# Patient Record
Sex: MALE | Race: WHITE | NOT HISPANIC OR LATINO | Employment: OTHER | URBAN - NONMETROPOLITAN AREA
[De-identification: names, ages, dates, MRNs, and addresses within clinical notes are randomized per-mention and may not be internally consistent; named-entity substitution may affect disease eponyms.]

---

## 2020-01-23 ENCOUNTER — OFFICE VISIT CONVERTED (OUTPATIENT)
Dept: FAMILY MEDICINE CLINIC | Facility: CLINIC | Age: 75
End: 2020-01-23
Attending: FAMILY MEDICINE

## 2020-01-23 ENCOUNTER — HOSPITAL ENCOUNTER (OUTPATIENT)
Dept: GENERAL RADIOLOGY | Facility: HOSPITAL | Age: 75
Discharge: HOME | End: 2020-01-23
Attending: FAMILY MEDICINE

## 2020-01-23 ENCOUNTER — CONVERSION ENCOUNTER (OUTPATIENT)
Dept: FAMILY MEDICINE CLINIC | Facility: CLINIC | Age: 75
End: 2020-01-23

## 2020-01-23 LAB
ALBUMIN SERPL-MCNC: 4.5 G/DL (ref 3.5–5)
ALBUMIN/GLOB SERPL: 1.6 {RATIO} (ref 1.4–2.6)
ALP SERPL-CCNC: 160 U/L (ref 56–155)
ALT SERPL-CCNC: 26 U/L (ref 10–40)
ANION GAP SERPL CALC-SCNC: 24 MMOL/L (ref 8–19)
AST SERPL-CCNC: 24 U/L (ref 15–50)
BASOPHILS # BLD AUTO: 0.04 10*3/UL (ref 0–0.2)
BASOPHILS NFR BLD AUTO: 0.6 % (ref 0–3)
BILIRUB SERPL-MCNC: 0.51 MG/DL (ref 0.2–1.3)
BUN SERPL-MCNC: 10 MG/DL (ref 5–25)
BUN/CREAT SERPL: 12 {RATIO} (ref 6–20)
CALCIUM SERPL-MCNC: 10.1 MG/DL (ref 8.7–10.4)
CHLORIDE SERPL-SCNC: 102 MMOL/L (ref 99–111)
CHOLEST SERPL-MCNC: 174 MG/DL (ref 107–200)
CHOLEST/HDLC SERPL: 3.9 {RATIO} (ref 3–6)
CONV ABS IMM GRAN: 0.02 10*3/UL (ref 0–0.2)
CONV CO2: 18 MMOL/L (ref 22–32)
CONV CREATININE URINE, RANDOM: 46.4 MG/DL (ref 10–300)
CONV IMMATURE GRAN: 0.3 % (ref 0–1.8)
CONV MICROALBUM.,U,RANDOM: <12 MG/L (ref 0–20)
CONV TOTAL PROTEIN: 7.4 G/DL (ref 6.3–8.2)
CREAT UR-MCNC: 0.86 MG/DL (ref 0.7–1.2)
DEPRECATED RDW RBC AUTO: 43.5 FL (ref 35.1–43.9)
EOSINOPHIL # BLD AUTO: 0.09 10*3/UL (ref 0–0.7)
EOSINOPHIL # BLD AUTO: 1.4 % (ref 0–7)
ERYTHROCYTE [DISTWIDTH] IN BLOOD BY AUTOMATED COUNT: 12.2 % (ref 11.6–14.4)
EST. AVERAGE GLUCOSE BLD GHB EST-MCNC: 223 MG/DL
GFR SERPLBLD BASED ON 1.73 SQ M-ARVRAT: >60 ML/MIN/{1.73_M2}
GLOBULIN UR ELPH-MCNC: 2.9 G/DL (ref 2–3.5)
GLUCOSE SERPL-MCNC: 217 MG/DL (ref 70–99)
HBA1C MFR BLD: 9.4 % (ref 3.5–5.7)
HCT VFR BLD AUTO: 45.7 % (ref 42–52)
HDLC SERPL-MCNC: 45 MG/DL (ref 40–60)
HGB BLD-MCNC: 14.8 G/DL (ref 14–18)
LDLC SERPL CALC-MCNC: 116 MG/DL (ref 70–100)
LYMPHOCYTES # BLD AUTO: 1.55 10*3/UL (ref 1–5)
LYMPHOCYTES NFR BLD AUTO: 24.1 % (ref 20–45)
MCH RBC QN AUTO: 30.8 PG (ref 27–31)
MCHC RBC AUTO-ENTMCNC: 32.4 G/DL (ref 33–37)
MCV RBC AUTO: 95 FL (ref 80–96)
MICROALBUMIN/CREAT UR: 25.9 MG/G{CRE} (ref 0–25)
MONOCYTES # BLD AUTO: 0.36 10*3/UL (ref 0.2–1.2)
MONOCYTES NFR BLD AUTO: 5.6 % (ref 3–10)
NEUTROPHILS # BLD AUTO: 4.38 10*3/UL (ref 2–8)
NEUTROPHILS NFR BLD AUTO: 68 % (ref 30–85)
NRBC CBCN: 0 % (ref 0–0.7)
OSMOLALITY SERPL CALC.SUM OF ELEC: 294 MOSM/KG (ref 273–304)
PLATELET # BLD AUTO: 215 10*3/UL (ref 130–400)
PMV BLD AUTO: 11.6 FL (ref 9.4–12.4)
POTASSIUM SERPL-SCNC: 4.7 MMOL/L (ref 3.5–5.3)
PSA SERPL-MCNC: 4.01 NG/ML (ref 0–4)
RBC # BLD AUTO: 4.81 10*6/UL (ref 4.7–6.1)
SODIUM SERPL-SCNC: 139 MMOL/L (ref 135–147)
TRIGL SERPL-MCNC: 65 MG/DL (ref 40–150)
TSH SERPL-ACNC: 3.11 M[IU]/L (ref 0.27–4.2)
VLDLC SERPL-MCNC: 13 MG/DL (ref 5–37)
WBC # BLD AUTO: 6.44 10*3/UL (ref 4.8–10.8)

## 2020-01-27 ENCOUNTER — HOSPITAL ENCOUNTER (OUTPATIENT)
Dept: ULTRASOUND IMAGING | Facility: HOSPITAL | Age: 75
Discharge: HOME | End: 2020-01-27
Attending: FAMILY MEDICINE

## 2020-02-14 ENCOUNTER — OFFICE VISIT CONVERTED (OUTPATIENT)
Dept: FAMILY MEDICINE CLINIC | Facility: CLINIC | Age: 75
End: 2020-02-14
Attending: FAMILY MEDICINE

## 2020-02-14 ENCOUNTER — CONVERSION ENCOUNTER (OUTPATIENT)
Dept: FAMILY MEDICINE CLINIC | Facility: CLINIC | Age: 75
End: 2020-02-14

## 2020-02-20 ENCOUNTER — HOSPITAL ENCOUNTER (OUTPATIENT)
Dept: CT IMAGING | Facility: HOSPITAL | Age: 75
Discharge: HOME | End: 2020-02-20
Attending: FAMILY MEDICINE

## 2020-11-20 ENCOUNTER — OFFICE VISIT CONVERTED (OUTPATIENT)
Dept: UROLOGY | Facility: CLINIC | Age: 75
End: 2020-11-20
Attending: NURSE PRACTITIONER

## 2020-12-04 ENCOUNTER — OFFICE VISIT CONVERTED (OUTPATIENT)
Dept: UROLOGY | Facility: CLINIC | Age: 75
End: 2020-12-04
Attending: NURSE PRACTITIONER

## 2020-12-10 ENCOUNTER — OFFICE VISIT CONVERTED (OUTPATIENT)
Dept: UROLOGY | Facility: CLINIC | Age: 75
End: 2020-12-10
Attending: NURSE PRACTITIONER

## 2021-01-07 ENCOUNTER — OFFICE VISIT CONVERTED (OUTPATIENT)
Dept: FAMILY MEDICINE CLINIC | Facility: CLINIC | Age: 76
End: 2021-01-07
Attending: FAMILY MEDICINE

## 2021-01-07 ENCOUNTER — HOSPITAL ENCOUNTER (OUTPATIENT)
Dept: GENERAL RADIOLOGY | Facility: HOSPITAL | Age: 76
Discharge: HOME | End: 2021-01-07
Attending: FAMILY MEDICINE

## 2021-01-07 LAB
ALBUMIN SERPL-MCNC: 3.2 G/DL (ref 3.5–5)
ALBUMIN/GLOB SERPL: 0.8 {RATIO} (ref 1.4–2.6)
ALP SERPL-CCNC: 214 U/L (ref 56–155)
ALT SERPL-CCNC: 23 U/L (ref 10–40)
ANION GAP SERPL CALC-SCNC: 14 MMOL/L (ref 8–19)
AST SERPL-CCNC: 19 U/L (ref 15–50)
BASOPHILS # BLD AUTO: 0.04 10*3/UL (ref 0–0.2)
BASOPHILS NFR BLD AUTO: 0.4 % (ref 0–3)
BILIRUB SERPL-MCNC: 0.29 MG/DL (ref 0.2–1.3)
BUN SERPL-MCNC: 10 MG/DL (ref 5–25)
BUN/CREAT SERPL: 10 {RATIO} (ref 6–20)
CALCIUM SERPL-MCNC: 10 MG/DL (ref 8.7–10.4)
CHLORIDE SERPL-SCNC: 99 MMOL/L (ref 99–111)
CONV ABS IMM GRAN: 0.05 10*3/UL (ref 0–0.2)
CONV CO2: 28 MMOL/L (ref 22–32)
CONV CREATININE URINE, RANDOM: 38.7 MG/DL (ref 10–300)
CONV IMMATURE GRAN: 0.5 % (ref 0–1.8)
CONV MICROALBUM.,U,RANDOM: 26.5 MG/L (ref 0–20)
CONV TOTAL PROTEIN: 7 G/DL (ref 6.3–8.2)
CREAT UR-MCNC: 0.96 MG/DL (ref 0.7–1.2)
DEPRECATED RDW RBC AUTO: 42.2 FL (ref 35.1–43.9)
EOSINOPHIL # BLD AUTO: 0.16 10*3/UL (ref 0–0.7)
EOSINOPHIL # BLD AUTO: 1.7 % (ref 0–7)
ERYTHROCYTE [DISTWIDTH] IN BLOOD BY AUTOMATED COUNT: 12 % (ref 11.6–14.4)
EST. AVERAGE GLUCOSE BLD GHB EST-MCNC: 306 MG/DL
GFR SERPLBLD BASED ON 1.73 SQ M-ARVRAT: >60 ML/MIN/{1.73_M2}
GLOBULIN UR ELPH-MCNC: 3.8 G/DL (ref 2–3.5)
GLUCOSE SERPL-MCNC: 237 MG/DL (ref 70–99)
HBA1C MFR BLD: 12.3 % (ref 3.5–5.7)
HCT VFR BLD AUTO: 36.2 % (ref 42–52)
HGB BLD-MCNC: 11.7 G/DL (ref 14–18)
LYMPHOCYTES # BLD AUTO: 1.32 10*3/UL (ref 1–5)
LYMPHOCYTES NFR BLD AUTO: 14.1 % (ref 20–45)
MCH RBC QN AUTO: 30.7 PG (ref 27–31)
MCHC RBC AUTO-ENTMCNC: 32.3 G/DL (ref 33–37)
MCV RBC AUTO: 95 FL (ref 80–96)
MICROALBUMIN/CREAT UR: 68.5 MG/G{CRE} (ref 0–25)
MONOCYTES # BLD AUTO: 0.51 10*3/UL (ref 0.2–1.2)
MONOCYTES NFR BLD AUTO: 5.4 % (ref 3–10)
NEUTROPHILS # BLD AUTO: 7.3 10*3/UL (ref 2–8)
NEUTROPHILS NFR BLD AUTO: 77.9 % (ref 30–85)
NRBC CBCN: 0 % (ref 0–0.7)
OSMOLALITY SERPL CALC.SUM OF ELEC: 289 MOSM/KG (ref 273–304)
PLATELET # BLD AUTO: 574 10*3/UL (ref 130–400)
PMV BLD AUTO: 10.3 FL (ref 9.4–12.4)
POTASSIUM SERPL-SCNC: 4.9 MMOL/L (ref 3.5–5.3)
RBC # BLD AUTO: 3.81 10*6/UL (ref 4.7–6.1)
SODIUM SERPL-SCNC: 136 MMOL/L (ref 135–147)
WBC # BLD AUTO: 9.38 10*3/UL (ref 4.8–10.8)

## 2021-01-08 LAB
PSA FREE MFR SERPL: 20.6 %
PSA FREE SERPL-MCNC: 1.42 NG/ML
PSA SERPL-MCNC: 6.9 NG/ML (ref 0–4)
T4 FREE SERPL-MCNC: 1.3 NG/DL (ref 0.9–1.8)
TSH SERPL-ACNC: 4.93 M[IU]/L (ref 0.27–4.2)

## 2021-01-11 ENCOUNTER — HOSPITAL ENCOUNTER (OUTPATIENT)
Dept: CT IMAGING | Facility: HOSPITAL | Age: 76
Discharge: HOME | End: 2021-01-11
Attending: FAMILY MEDICINE

## 2021-02-09 ENCOUNTER — OFFICE VISIT CONVERTED (OUTPATIENT)
Dept: GASTROENTEROLOGY | Facility: CLINIC | Age: 76
End: 2021-02-09
Attending: NURSE PRACTITIONER

## 2021-02-23 ENCOUNTER — HOSPITAL ENCOUNTER (OUTPATIENT)
Dept: LAB | Facility: HOSPITAL | Age: 76
Discharge: HOME | End: 2021-02-23
Attending: NURSE PRACTITIONER

## 2021-02-23 LAB
C DIFF TOX B STL QL CT TISS CULT: NEGATIVE
CONV 027 TOXIN: NEGATIVE

## 2021-02-25 LAB — BACTERIA SPEC AEROBE CULT: NORMAL

## 2021-03-03 LAB — ELASTASE PANC STL-MCNT: <50 UG ELAST./G

## 2021-04-12 ENCOUNTER — HOSPITAL ENCOUNTER (OUTPATIENT)
Dept: PREADMISSION TESTING | Facility: HOSPITAL | Age: 76
Discharge: HOME | End: 2021-04-12
Attending: INTERNAL MEDICINE

## 2021-04-13 LAB — SARS-COV-2 RNA SPEC QL NAA+PROBE: NOT DETECTED

## 2021-04-22 ENCOUNTER — HOSPITAL ENCOUNTER (OUTPATIENT)
Dept: GASTROENTEROLOGY | Facility: HOSPITAL | Age: 76
Setting detail: HOSPITAL OUTPATIENT SURGERY
Discharge: HOME | End: 2021-04-22
Attending: INTERNAL MEDICINE

## 2021-04-22 LAB — GLUCOSE BLD-MCNC: 171 MG/DL (ref 70–99)

## 2021-05-10 NOTE — H&P
History and Physical      Patient Name: Ayan Selby   Patient ID: [id number]   Sex: Male   YOB: 1945    Primary Care Provider: Savanna Obando DO    Visit Date: February 9, 2021    Provider: ABHILASH Gonzales   [place] [facility]   Location Address: [address] [place] [address], [address]  [place], [place]  [id number]   Location Phone: [phone]          Chief Complaint  · abnormal imaging      History Of Present Illness  The patient is a 75 year old /White male who presents on referral from Savanna Obando DO for a gastroenterology evaluation.      Mr. Selby reports LLQ pain for the last 3 years. Feels that there is a knot in his stomach that goes away if he passes gas or has a BM but returns several hours later. Also having diarrhea 3-5x daily, loose stool unless he takes Imodium. Has been taking Imodium every 2-3days. Denies any hematochezia, or melena. Appetite stable however 50lb weight loss in the last 3 years as well.     Denies any current alcohol usage however reports he previously would drink heavily on the weekends, however years ago. Denies heartburn, nausea, vomiting, or dysphagia.     FMH: Brother-colon cancer, dx age 59; sister- colon cancer    CT of the abdomen pelvis with and without contrast from 1/7/21:.  Subtle decrease in density in the left kidney.  Cholelithiasis. Mild Prostatomegaly. Multiple nonobstructing bilateral renal stones.  Calcifications are noted in the pancreas consistent with a history of chronic pancreatitis. The main pancreatic duct in the pancreatic head is a large stone measuring around 1 cm.    CT the abdomen and pelvis without contrast 2/20/2020: Clinical thickening with slight induration no adjacent fat.  History of frequent folliculitis, but no underlying lesion.  Not rigid..  Stool throughout colon.  Hepatic steatosis.  Heterogeneous, enlarged prostate.  Early fibrotic changes in lung bases.       Past Medical  History  Colitis, Ulcerative; Deafness; Diabetes Mellitus, Type II; Essential hypertension; Hyperlipidemia; mixed; Kidney stones         Past Surgical History  Amputation Leg; Colonoscopy         Medication List  Advil 200 mg oral tablet; Flomax 0.4 mg oral capsule; glimepiride 4 mg oral tablet; Imodium A-D 2 mg oral tablet; metformin 500 mg oral tablet; Suprep Bowel Prep Kit 17.5-3.13-1.6 gram oral recon soln         Allergy List  NO KNOWN DRUG ALLERGIES       Allergies Reconciled  Family Medical History  Heart Disease; Family history of colon cancer; -Mother's Family History Unknown; Diabetes         Social History  Active but no formal exercise; Alcohol (Never); Denies illicit substance abuse; Denies substance abuse; Disabled; Retired; Second hand smoke exposure (Never); Tobacco (Never)         Immunizations  Name Date Admin   Influenza Refused         Review of Systems  · Constitutional  o Denies  o : chills, fever  · Eyes  o Denies  o : blurred vision, changes in vision  · Cardiovascular  o Denies  o : chest pain, syncope  · Respiratory  o Denies  o : shortness of breath, dry cough  · Gastrointestinal  o Admits  o : See HPI  · Genitourinary  o Denies  o : dysuria, blood in urine  · Integument  o Denies  o : rash, new skin lesions  · Neurologic  o Denies  o : altered mental status, tingling or numbness  · Musculoskeletal  o Denies  o : joint pain, limitation of motion  · Endocrine  o Denies  o : weight gain, weight loss  · Psychiatric  o Denies  o : anxiety, depression      Vitals  Date Time BP Position Site L\R Cuff Size HR RR TEMP (F) WT  HT  BMI kg/m2 BSA m2 O2 Sat FR L/min FiO2        02/09/2021 01:50 /56 Sitting    72 - R 14 97.6 100lbs 0oz                Physical Examination  · Constitutional  o Appearance  o : well developed, well-nourished, in no acute distress  · Eyes  o Vision  o :   § Visual Fields  § : eyes move symmetrical in all directions  o Sclerae  o : anicteric  o Pupils and Irises  o :  pupils equal and symmetrical  · Neck  o Inspection/Palpation  o : supple  · Respiratory  o Respiratory Effort  o : breathing unlabored  o Inspection of Chest  o : normal appearance, no retractions  o Auscultation of Lungs  o : clear to auscultation bilaterally  · Cardiovascular  o Heart  o :   § Auscultation of Heart  § : no murmurs, gallops or rubs  · Gastrointestinal  o Abdominal Examination  o : left-lower quadrant tenderness to palpation present, normal bowel sounds, tone normal without rigidity or guarding, no masses present, abdomen scaphoid upon supine  o Digital Rectal Exam  o : deferred  · Lymphatic  o Neck  o : no palpable lymphadenopathy  · Skin and Subcutaneous Tissue  o General Inspection  o : without focal lesions; turgor is normal  · Psychiatric  o General  o : Alert and oriented x3  o Mood and Affect  o : Mood and affect are appropriate to circumstances          Assessment  · Abnormal finding on GI tract imaging     793.4/R93.3  · Diarrhea     787.91/R19.7  · Weight loss     783.21/R63.4  · Family hx of colon cancer     V16.0/Z80.0  · Dilated pancreatic duct     577.8/K86.89      Plan  · Orders  o C difficile Toxigenic Assay (PCR) [facility] (61904) - - 02/09/2021  o Stool culture and sensitivity (92100) - - 02/09/2021  o Giardia and Cryptosporidium Enzyme Immunoassay [facility] (19632, 75666) - - 02/09/2021  o Pancreatic elastase stool (02218) - - 02/09/2021  o Consent for Colonoscopy with Possible Biopsy - Possible risks/complications, benefits, and alternatives to surgical or invasive procedure have been explained to patient and/or legal guardian. -Patient has been evaluated and can tolerate anesthesia and/or sedation. Risks, benefits, and alternatives to anesthesia and sedation have been explained to patient and/or legal guardian. (31461) - - 02/09/2021  o Upper GI endoscopy with endoscopic ultrasound (90707) - - 02/09/2021  · Medications  o Medications have been Reconciled  o Transition of Care or Provider  Policy  · Instructions  o Handouts provided: Pre-procedure instructions including date and time and location of procedure.  o PLAN: Proceed with procedure. Patient understands risks and benefits and is willing to proceed. Understands the risks include, but are not limited to, bleeding and/or perforation.  o Information given on current diagnoses.  o Lifestyle modifications discussed.  o Electronically Identified Patient Education Materials Provided Electronically  · Disposition  o Follow up after procedure            Electronically Signed by: ABHILASH Gonzales -Author on February 9, 2021 03:42:52 PM

## 2021-05-10 NOTE — H&P
"   History and Physical      Patient Name: Ayan Selby   Patient ID: [id number]   Sex: Male   YOB: 1945    Primary Care Provider: Savanna Obando DO   Referring Provider: Savanna Obando DO    Visit Date: November 20, 2020    Provider: ABHILASH Steven   Location: [facility]   Location Address: [address] [place] [address]  [id number]   Location Phone: [phone]          Chief Complaint  · pt here for urological concerns      History Of Present Illness  The patient is a 74 year old /White male presents today in office to be seen for urinary retention due to an unknown reason.   He has had the current problem for 1 week with no relief of symptoms.      Patient was seen in the emergency department on 11/16/2020 and had an indwelling Jernigan catheter placed due to urinary retention.  Patient had complained of rectal pain\" prostate pain\"    The patient has poorly controlled diabetes and only takes his Metformin when his blood sugar is high per patient.    The patient reports a \"bulge\" in his left inguinal area when he has to have a bowel movement.  He states that this goes away when he has a bowel movement.    The patient was found to have thickening of his colon on CT scan in February of this year however that has not been followed up related to COVID-19.    The patient does not have prescription drug coverage and his girlfriend states that due to this fact the patient was not treated for prostatitis as to ensure he would not incur a significant charge.    The patient was started on tamsulosin 0.4 mg once a day for 7 days.           Past Medical History  Colitis, Ulcerative; Deafness; Diabetes Mellitus, Type II; Essential hypertension; Hyperlipidemia; mixed; Kidney stones         Past Surgical History  Amputation Leg; Colonoscopy         Medication List  Flomax 0.4 mg oral capsule; metformin 500 mg oral tablet         Allergy List  NO KNOWN DRUG ALLERGIES "         Family Medical History  Heart Disease; Colon Cancer; Family history of colon cancer; -Mother's Family History Unknown; Diabetes         Social History  Active but no formal exercise; Alcohol (Never); Denies illicit substance abuse; Denies substance abuse; Disabled; Retired; Second hand smoke exposure (Never); Tobacco (Never)         Immunizations  Name Date Admin   Influenza Refused         Review of Systems  · Constitutional  o Denies  o : chills, fever  · Gastrointestinal  o Denies  o : nausea, vomiting, flank pain  · Genitourinary  o Admits  o : frequency of urine, urgency with urine, urinary leakage  o Denies  o : abnormal color of urine, burning with urination      Vitals  Date Time BP Position Site L\R Cuff Size HR RR TEMP (F) WT  HT  BMI kg/m2 BSA m2 O2 Sat FR L/min FiO2 HC       11/20/2020 10:39 AM      97 - R 16      97 %            Physical Examination  · Constitutional  o Appearance  o : cachetic, small body habitus, appears chronically ill   · Head and Face  o Head  o :   § Inspection  § : atraumatic, normocephalic  o Face  o :   § Inspection  § : no facial lesions  · Eyes  o Sclerae  o : sclerae white  · Ears, Nose, Mouth and Throat  o Ears  o :   § External Ears  § : appearance within normal limits, no lesions present  o Nose  o :   § External Nose  § : appearance normal  · Neck  o Inspection/Palpation  o : normal appearance, trachea midline  · Respiratory  o Respiratory Effort  o : breathing unlabored  o Inspection of Chest  o : normal appearance, no retractions  o Auscultation of Lungs  o : normal breath sounds throughout  · Skin and Subcutaneous Tissue  o General Inspection  o : no rashes or lesions present, no lesions present, no areas of discoloration  · Neurologic  o Mental Status Examination  o :   § Orientation  § : grossly oriented to person, place and time  § Speech/Language  § : communication ability within normal limits  o Gait and Station  o : wheelchair-dependent    · Psychiatric  o Judgement and Insight  o : judgment and insight intact, judgement for everyday activities and social situations within normal limits, insight intact  o Mood and Affect  o : mood normal, affect appropriate          Results  · In-Office Procedures  o Lab procedure  § Automated dipstick urinalysis with microscopy (19721)   § Color Ur: Yellow   § Clarity Ur: Clear   § Glucose Ur Ql Strip: >=1000   § Bilirub Ur Ql Strip: Negative   § Ketones Ur Ql Strip: Negative   § Sp Gr Ur Qn: 1.020   § Hgb Ur Ql Strip: Trace-intact   § pH Ur-LsCnc: 6.5   § Prot Ur Ql Strip: Negative   § Urobilinogen Ur Strip-mCnc: 0.2   § Nitrite Ur Ql Strip: Negative   § WBC Est Ur Ql Strip: Negative   § RBC UrnS Qn HPF: 0-1   § WBC UrnS Qn HPF: 0   § Bacteria UrnS Qn HPF: 0   § Crystals UrnS Qn HPF: 0   § Epithelial Cells (non renal): 0 /HPF  § Epithelial Cells (renal): 0   o Surgical procedure  § IOP - Bladder Scan/Residual Urine (97694)   § Specimen vol Ur: 599       Assessment  · Urinary retention     788.20/R33.9      Plan  · Medications  o doxycycline hyclate 100 mg oral capsule   SIG: take 1 capsule (100 mg) by oral route 2 times per day for 28 days   DISP: (56) Capsule with 0 refills  Prescribed on 11/20/2020     o Flomax 0.4 mg oral capsule   SIG: take 2 capsules (0.8 mg) by oral route once daily 1/2 hour following the same meal each day for 90 days   DISP: (180) Capsule with 4 refills  Adjusted on 11/20/2020     o Medications have been Reconciled  o Transition of Care or Provider Policy  · Instructions  o Discussed with the patient that his urgency frequency and urinary leakage more than likely related to his urinary retention as when the Jernigan catheter was placed he had approximately 850 cc of pale yellow urine returned. Will treat patient for prostatitis and double his tamsulosin dosage. Good Rx card was given as well as prescription sent to the cheapest nearby pharmacy which is [facility]. Will follow up with patient in 2  mahad. Discussed with the patient that he has to leave his Jernigan catheter in place until his appointment.  o Electronically Identified Patient Education Materials Provided Electronically            Electronically Signed by: ABHILASH Steven -Author on November 20, 2020 02:03:33 PM

## 2021-05-13 NOTE — PROGRESS NOTES
"   Progress Note      Patient Name: Ayan Selby   Patient ID: [id number]   Sex: Male   YOB: 1945    Primary Care Provider: Savanna Obando DO    Visit Date: December 4, 2020    Provider: ABHILASH Steven   Location: [facility]   Location Address: [address] [place] [address]  [id number]   Location Phone: [phone]          Chief Complaint  · pt here for urological concerns      History Of Present Illness  The patient is a 74 year old /White male presents today in office for void trial.      Patient had 180 CC of sterile water instilled into bladder via catheter. Catheter was removed and patient was able to void 130CC of fluid.    Patient was taught how to CIC in office as to avoid another trip to the emergency department in case he is to have recurrent urinary retention.  Educated patient that he is to only perform this if he is having bladder pain with inability to void or if he has not been able to void for at least a period of 8 hours.    The patient has not yet made a follow-up appointment with his primary care provider to have his CT scan rescheduled.    The patient has 2 weeks left on his doxycycline and has been consistently taking the Tamsulosin.      Previous:  Patient was seen in the emergency department on 11/16/2020 and had an indwelling Jernigan catheter placed due to urinary retention.  Patient had complained of rectal pain\" prostate pain\"    The patient has poorly controlled diabetes and only takes his Metformin when his blood sugar is high per patient.    The patient reports a \"bulge\" in his left inguinal area when he has to have a bowel movement.  He states that this goes away when he has a bowel movement.    The patient was found to have thickening of his colon on CT scan in February of this year however that has not been followed up related to COVID-19.    The patient does not have prescription drug coverage and his girlfriend states that due " to this fact the patient was not treated for prostatitis as to ensure he would not incur a significant charge.    The patient was started on tamsulosin 0.4 mg once a day for 7 days.           Past Medical History  Colitis, Ulcerative; Deafness; Diabetes Mellitus, Type II; Essential hypertension; Hyperlipidemia; mixed; Kidney stones         Past Surgical History  Amputation Leg; Colonoscopy         Medication List  doxycycline hyclate 100 mg oral capsule; Flomax 0.4 mg oral capsule; metformin 500 mg oral tablet         Allergy List  NO KNOWN DRUG ALLERGIES         Family Medical History  Heart Disease; Colon Cancer; Family history of colon cancer; -Mother's Family History Unknown; Diabetes         Social History  Active but no formal exercise; Alcohol (Never); Denies illicit substance abuse; Denies substance abuse; Disabled; Retired; Second hand smoke exposure (Never); Tobacco (Never)         Immunizations  Name Date Admin   Influenza Refused         Review of Systems  · Constitutional  o Denies  o : chills, fever  · Gastrointestinal  o Denies  o : nausea, vomiting, flank pain  · Genitourinary  o Admits  o : frequency of urine, urgency with urine, urinary leakage  o Denies  o : abnormal color of urine, burning with urination      Vitals  Date Time BP Position Site L\R Cuff Size HR RR TEMP (F) WT  HT  BMI kg/m2 BSA m2 O2 Sat FR L/min FiO2 HC       12/04/2020 01:22 PM       16  110lbs 0oz                Physical Examination  · Constitutional  o Appearance  o : cachetic, small body habitus, appears chronically ill   · Head and Face  o Head  o :   § Inspection  § : atraumatic, normocephalic  o Face  o :   § Inspection  § : no facial lesions  · Eyes  o Sclerae  o : sclerae white  · Ears, Nose, Mouth and Throat  o Ears  o :   § External Ears  § : appearance within normal limits, no lesions present  o Nose  o :   § External Nose  § : appearance normal  · Neck  o Inspection/Palpation  o : normal appearance, trachea  midline  · Respiratory  o Respiratory Effort  o : breathing unlabored  o Inspection of Chest  o : normal appearance, no retractions  o Auscultation of Lungs  o : normal breath sounds throughout  · Skin and Subcutaneous Tissue  o General Inspection  o : no rashes or lesions present, no lesions present, no areas of discoloration  · Neurologic  o Mental Status Examination  o :   § Orientation  § : grossly oriented to person, place and time  § Speech/Language  § : communication ability within normal limits  o Gait and Station  o : wheelchair-dependent   · Psychiatric  o Judgement and Insight  o : judgment and insight intact, judgement for everyday activities and social situations within normal limits, insight intact  o Mood and Affect  o : mood normal, affect appropriate              Assessment  · Urinary retention     788.20/R33.9      Plan  · Medications  o Medications have been Reconciled  o Transition of Care or Provider Policy  · Instructions  o Discussed with the patient importance of finishing antibiotics to completion. Patient to continue his tamsulosin. We will follow-up with patient in office next week for PVR check. Discussed the possibility of surgical intervention should recurrent urinary retention be related to his prostate. Urged patient once again to reach out to primary care provider to make a follow-up appointment so that his CT scan of his abdomen and pelvis may be rescheduled. I did reach out to his primary care provider as well who is office will attempt to reach out to the patient to make an appointment for follow-up.  o Electronically Identified Patient Education Materials Provided Electronically            Electronically Signed by: ABHILASH Steven -Author on December 4, 2020 02:25:17 PM

## 2021-05-13 NOTE — PROGRESS NOTES
"   Progress Note      Patient Name: Ayan Selby   Patient ID: [id number]   Sex: Male   YOB: 1945    Primary Care Provider: Savanna Obando DO    Visit Date: December 10, 2020    Provider: ABHILASH Steven   Location: [facility]   Location Address: [address] [place] [address]  [id number]   Location Phone: [phone]          Chief Complaint  · pt here for urological concerns      History Of Present Illness  The patient is a 74 year old /White male presents today in office for PVR check.      The patient reports that he has been voiding well on his own.     He does report voiding every hour and states this is very bothersome.      Previous:  Patient was seen in the emergency department on 11/16/2020 and had an indwelling Jernigan catheter placed due to urinary retention.  Patient had complained of rectal pain\" prostate pain\"    The patient has poorly controlled diabetes and only takes his Metformin when his blood sugar is high per patient.    The patient reports a \"bulge\" in his left inguinal area when he has to have a bowel movement.  He states that this goes away when he has a bowel movement.    The patient was found to have thickening of his colon on CT scan in February of this year however that has not been followed up related to COVID-19.    The patient does not have prescription drug coverage and his girlfriend states that due to this fact the patient was not treated for prostatitis as to ensure he would not incur a significant charge.    The patient was started on tamsulosin 0.4 mg once a day for 7 days.           Past Medical History  Colitis, Ulcerative; Deafness; Diabetes Mellitus, Type II; Essential hypertension; Hyperlipidemia; mixed; Kidney stones         Past Surgical History  Amputation Leg; Colonoscopy         Medication List  doxycycline hyclate 100 mg oral capsule; Flomax 0.4 mg oral capsule; metformin 500 mg oral tablet         Allergy List  NO " KNOWN DRUG ALLERGIES         Family Medical History  Heart Disease; Colon Cancer; Family history of colon cancer; -Mother's Family History Unknown; Diabetes         Social History  Active but no formal exercise; Alcohol (Never); Denies illicit substance abuse; Denies substance abuse; Disabled; Retired; Second hand smoke exposure (Never); Tobacco (Never)         Immunizations  Name Date Admin   Influenza Refused         Review of Systems  · Constitutional  o Denies  o : chills, fever  · Gastrointestinal  o Denies  o : nausea, vomiting, flank pain  · Genitourinary  o Admits  o : frequency of urine, urgency with urine, urinary leakage  o Denies  o : abnormal color of urine, burning with urination      Vitals  Date Time BP Position Site L\R Cuff Size HR RR TEMP (F) WT  HT  BMI kg/m2 BSA m2 O2 Sat FR L/min FiO2 HC       12/10/2020 02:48 PM      67 - R 14      67 %            Physical Examination  · Constitutional  o Appearance  o : cachetic, small body habitus, appears chronically ill   · Head and Face  o Head  o :   § Inspection  § : atraumatic, normocephalic  o Face  o :   § Inspection  § : no facial lesions  · Eyes  o Sclerae  o : sclerae white  · Ears, Nose, Mouth and Throat  o Ears  o :   § External Ears  § : appearance within normal limits, no lesions present  o Nose  o :   § External Nose  § : appearance normal  · Neck  o Inspection/Palpation  o : normal appearance, trachea midline  · Respiratory  o Respiratory Effort  o : breathing unlabored  o Inspection of Chest  o : normal appearance, no retractions  o Auscultation of Lungs  o : normal breath sounds throughout  · Skin and Subcutaneous Tissue  o General Inspection  o : no rashes or lesions present, no lesions present, no areas of discoloration  · Neurologic  o Mental Status Examination  o :   § Orientation  § : grossly oriented to person, place and time  § Speech/Language  § : communication ability within normal limits  o Gait and Station  o :  wheelchair-dependent   · Psychiatric  o Judgement and Insight  o : judgment and insight intact, judgement for everyday activities and social situations within normal limits, insight intact  o Mood and Affect  o : mood normal, affect appropriate          Results  · In-Office Procedures  o Surgical procedure  § IOP - Bladder Scan/Residual Urine (73583)   § Specimen vol Ur: 207  12/10/2020 3:01 PM (scase): pt voided and now have 98 ml in bladder. sc      Assessment  · Urinary retention     788.20/R33.9      Plan  · Medications  o Medications have been Reconciled  o Transition of Care or Provider Policy  · Instructions  o Discussed with the patient importance of finishing antibiotics to completion. Patient to continue his tamsulosin. We will follow-up with patient in office in 3 weeks to discuss symptoms and PVR check. Discussed the possibility of surgical intervention should recurrent urinary retention be related to his prostate.   o Electronically Identified Patient Education Materials Provided Electronically            Electronically Signed by: ABHILASH Steven -Author on December 10, 2020 04:00:39 PM

## 2021-05-14 VITALS — OXYGEN SATURATION: 67 % | RESPIRATION RATE: 14 BRPM | HEART RATE: 67 BPM

## 2021-05-14 VITALS — WEIGHT: 110 LBS | RESPIRATION RATE: 16 BRPM

## 2021-05-14 VITALS
WEIGHT: 100 LBS | TEMPERATURE: 97.6 F | HEART RATE: 72 BPM | SYSTOLIC BLOOD PRESSURE: 113 MMHG | RESPIRATION RATE: 14 BRPM | DIASTOLIC BLOOD PRESSURE: 56 MMHG

## 2021-05-14 VITALS
OXYGEN SATURATION: 100 % | SYSTOLIC BLOOD PRESSURE: 117 MMHG | HEART RATE: 72 BPM | DIASTOLIC BLOOD PRESSURE: 56 MMHG | TEMPERATURE: 97.8 F | RESPIRATION RATE: 16 BRPM

## 2021-05-14 VITALS — OXYGEN SATURATION: 97 % | RESPIRATION RATE: 16 BRPM | HEART RATE: 97 BPM

## 2021-05-14 NOTE — PROGRESS NOTES
Progress Note      Patient Name: Ayan Selby   Patient ID: [id number]   Sex: Male   YOB: 1945    Primary Care Provider: Savanna Obando DO    Visit Date: January 7, 2021    Provider: Savanna Obando DO   Location: [facility]   Location Address: [address] [place] [address]  [id number]   Location Phone: [phone]          Chief Complaint  · Weak   · loss of appetite   · LLQ pain       History Of Present Illness  Ayan Selby is a 75 year old /White male who presents for evaluation and treatment of:      He is here today for a follow-up for the management of his chronic medical conditions.  He has a past medical history significant for type 2 diabetes, hyperlipidemia, hypertension, ulcerative colitis and kidney stones.  He has had bilateral below the knee amputations in 1995.    Labs 11/16/2020: Hemoglobin 13.8, MCV 88.9, platelet 206, potassium 3.4, creatinine 1.12, GFR greater than 60, glucose 504, liver enzymes within normal limits.    Labs 1/23/2020 creatinine 0.86, GFR greater than 60, potassium 4.7, liver enzymes within normal limits, hemoglobin 14.8, platelets 215, triglycerides 174, HDL 45, , TSH 3.10, PSA 4.01.    Labs 1/23/2020: A1c 9.4.  He was not able to afford the Januvia that he was prescribed at his last clinic appointment because it was too expensive. When he checks his blood sugar fasting it is around 164.    On 11/16/2020 the patient was having problems urinating and so he went to the emergency room.  He was found to have urinary retention and a Jernigan catheter was placed.  On 12/10/2020 the patient was seen by urology for management of urinary retention.  He was placed on Tamsulosin and doxycycline.    CT scan abdomen pelvis 2/20/2020:   1.  Colonic wall thickening with slight induration adjacent fat favored to represent focal colitis but malignancy possible.  2.  Large amount of stool favoring constipation.  3.  Hepatic  steatosis.  4.  Chronic calcified pancreatitis  5.  Heterogeneous enlarged prostate    He has had to cath himself once since having the Jernigan catheter taken out. He says that he checked his sugar recently and it was 169.     He has two sisters that have sever liver damage from cholesterol medication.     He is complaining of diarrhea most days. He is still having the bulging    He takes Advil 200 mg 6-8 per day.     He still occasionally is having a swollen area in his lower left inguinal region.  It is associated with abdominal pain.  He says bowel movements make it better.  He denies any change in stool size or consistency.  He is still having bouts of diarrhea.  He denies blood in his stool.    He has no other complaints today denies chest pain, shortness of breath, weakness, numbness, nausea, vomiting, dizziness or syncopal event.            Past Medical History  Disease Name Date Onset Notes   Colitis, Ulcerative --  --    Deafness --  --    Diabetes Mellitus, Type II --  --    Essential hypertension 03/29/2016 --    Hyperlipidemia; mixed 03/29/2016 --    Kidney stones --  --          Past Surgical History  Procedure Name Date Notes   Amputation Leg 1995 Double amputation due to accident   Colonoscopy 2016 --          Medication List  Name Date Started Instructions   doxycycline hyclate 100 mg oral capsule 11/20/2020 take 1 capsule (100 mg) by oral route 2 times per day for 28 days   Flomax 0.4 mg oral capsule 11/20/2020 take 2 capsules (0.8 mg) by oral route once daily 1/2 hour following the same meal each day for 90 days         Allergy List  Allergen Name Date Reaction Notes   NO KNOWN DRUG ALLERGIES --  --  --          Family Medical History  Disease Name Relative/Age Notes   Heart Disease Father/  Mother/   --    Colon Cancer  --    Family history of colon cancer Brother/50s   Brother/50s   -Mother's Family History Unknown Mother/   Mother   Diabetes Brother/  Sister/   --          Social History  Finding  Status Start/Stop Quantity Notes   Active but no formal exercise --  --/-- --  --    Alcohol Never 0/0 --  drinks no   Denies illicit substance abuse --  --/-- --  --    Denies substance abuse --  --/-- --  --    Disabled --  --/-- --  --    Retired --  --/-- --  --    Second hand smoke exposure Never 0/0 --  no   Tobacco Never 0/0 --  never a smoker         Immunizations  NameDate Admin Mfg Trade Name Lot Number Route Inj VIS Given VIS Publication   InfluenzaRefused 01/23/2020 NE Not Entered  NE NE     Comments:          Review of Systems  · Constitutional  o * See HPI  · HENT  o * See HPI  · Cardiovascular  o * See HPI  · Respiratory  o * See HPI  · Gastrointestinal  o * See HPI  · Genitourinary  o * See HPI  · Neurologic  o * See HPI  · Musculoskeletal  o * See HPI      Vitals  Date Time BP Position Site L\R Cuff Size HR RR TEMP (F) WT  HT  BMI kg/m2 BSA m2 O2 Sat FR L/min FiO2 HC       01/07/2021 09:58 /56 Sitting    72 - R 16 97.8     100 %  21%          Physical Examination  · Constitutional  o Appearance  o : thin, well developed, alert, no obvious deformities present, wheelchair bound  · Head and Face  o Head  o :   § Inspection  § : atraumatic, normocephalic  · Ears, Nose, Mouth and Throat  o Ears  o :   § External Ears  § : normal  o Nose  o :   § Intranasal Exam  § : nares patent  o Oral Cavity  o :   § Oral Mucosa  § : moist mucous membranes  · Respiratory  o Respiratory Effort  o : breathing comfortably, symmetric chest rise  o Auscultation of Lungs  o : clear to asculatation bilaterally, no wheezes, rales, or rhonchii  · Cardiovascular  o Heart  o :   § Auscultation of Heart  § : regular rate and rhythm, no murmurs, rubs, or gallops  o Peripheral Vascular System  o :   § Extremities  § : no edema  · Neurologic  o Mental Status Examination  o :   § Orientation  § : grossly oriented to person, place and time  o Gait and Station  o :   § Gait Screening  § : normal gait  · Psychiatric  o General  o :  normal mood and affect     AKA B/L               Assessment  · Abdominal pain     789.00/R10.9  The patient was given order today for a CT of the abdomen and pelvis to be manage according to findings. He is also given a referral for colonoscopy.  · Diabetes mellitus, type 2     250.00/E11.9  The patient's diabetes is currently not well controlled. He was given prescription today for metformin extended release 500 mg twice daily. He was told to check his blood sugars twice a day bring a log back to his next appointment. An order for an A1c and microalbumin was placed in the chart today.  · Fatigue     780.79/R53.83  The patient was given orders today for labs which are in the chart and we shivani be reviewed and manage according findings  · Screening for prostate cancer     V76.44/Z12.5  · Enlarged prostate     600.00/N40.0  The patient was encouraged to follow-up with urology.  · Elevated PSA     790.93/R97.20  The patient was given lab order today for a PSA level to be masculine findings. He was encouraged to follow-up with urology.      Plan  · Orders  o CBC with Auto Diff [facility] ([id number]) - 789.00/R10.9, 250.00/E11.9, 780.79/R53.83 - 01/07/2021  o CMP [facility] ([id number]) - 789.00/R10.9, 780.79/R53.83 - 01/07/2021  o Hgb A1c [facility] ([id number]) - 789.00/R10.9 - 01/07/2021  o Thyroid Profile (12884, THYII, 27098) - 780.79/R53.83 - 01/07/2021  o Vitamin D (25-Hydroxy) Level (80925) - 780.79/R53.83 - 01/07/2021  o ACO-14: Influenza immunization was not administered for reasons documented [facility] () - - 01/07/2021   Patient declined   o ACO-19: Colorectal cancer screening results documented and reviewed (3017F) - - 01/07/2021  o ACO-13: Fall Risk Screening with no falls in past year or only one fall without injury in the past year (1101F) - - 01/07/2021  o ACO - Pt declines to or was not able to provide an Advance Care Plan or name a Surrogate Decision Maker (1124F) - - 01/07/2021  o ACO-39: Current medications updated and reviewed (,  1159F) - - 01/07/2021  o COLONOSCOPY REFERRAL (COLON) - 789.00/R10.9 - 01/07/2021   The patient wants to see Dr Recinos.   o PSA - Total and % Free (75947, 32401) - V76.44/Z12.5, 600.00/N40.0, 790.93/R97.20 - 01/07/2021  o Microalbumin urine (00110) - 250.00/E11.9 - 01/07/2021  o CT ABD&PELV with and without contrast (00657) - 789.00/R10.9 - 01/07/2021  · Medications  o Glucophage  mg oral tablet extended release 24 hr   SIG: take 1 tablet (500 mg) by oral route once bid   DISP: (60) Tablet with 2 refills  Prescribed on 01/07/2021     o metformin 500 mg oral tablet   SIG: take 1 tablet by oral route 2 times a day as needed   DISP: (0) Tablet with 0 refills  Discontinued on 01/07/2021     o Medications have been Reconciled  o Transition of Care or Provider Policy  · Instructions  o Instructed to seek medical attention urgently for new or worsening symptoms.  o Patient was educated/instructed on their diagnosis, treatment and medications prior to discharge from the clinic today.  · Disposition  o Follow Up in 6 weeks            Electronically Signed by: Savanna Obando DO -Author on January 7, 2021 07:01:25 PM

## 2021-05-15 VITALS
DIASTOLIC BLOOD PRESSURE: 67 MMHG | RESPIRATION RATE: 16 BRPM | WEIGHT: 145.5 LBS | SYSTOLIC BLOOD PRESSURE: 149 MMHG | OXYGEN SATURATION: 100 % | HEART RATE: 57 BPM | TEMPERATURE: 98.2 F

## 2021-05-15 VITALS
RESPIRATION RATE: 18 BRPM | WEIGHT: 114 LBS | HEART RATE: 76 BPM | SYSTOLIC BLOOD PRESSURE: 132 MMHG | DIASTOLIC BLOOD PRESSURE: 80 MMHG | OXYGEN SATURATION: 99 % | TEMPERATURE: 97.8 F

## 2023-04-12 ENCOUNTER — PREP FOR SURGERY (OUTPATIENT)
Dept: OTHER | Facility: HOSPITAL | Age: 78
End: 2023-04-12
Payer: MEDICARE

## 2023-04-12 ENCOUNTER — CLINICAL SUPPORT (OUTPATIENT)
Dept: GASTROENTEROLOGY | Facility: CLINIC | Age: 78
End: 2023-04-12
Payer: MEDICARE

## 2023-04-12 DIAGNOSIS — Z12.11 COLON CANCER SCREENING: Primary | ICD-10-CM

## 2023-04-12 RX ORDER — PANCRELIPASE 36000; 180000; 114000 [USP'U]/1; [USP'U]/1; [USP'U]/1
CAPSULE, DELAYED RELEASE PELLETS ORAL
COMMUNITY
Start: 2021-03-05

## 2023-04-12 RX ORDER — IBUPROFEN 200 MG
TABLET ORAL
COMMUNITY

## 2023-04-12 RX ORDER — LOPERAMIDE HYDROCHLORIDE 2 MG/1
TABLET ORAL
COMMUNITY

## 2023-04-12 RX ORDER — GLIMEPIRIDE 4 MG/1
TABLET ORAL
COMMUNITY
Start: 2021-04-19

## 2023-04-12 RX ORDER — SODIUM, POTASSIUM,MAG SULFATES 17.5-3.13G
1 SOLUTION, RECONSTITUTED, ORAL ORAL EVERY 12 HOURS
Qty: 354 ML | Refills: 0 | Status: SHIPPED | OUTPATIENT
Start: 2023-04-12

## 2023-04-12 NOTE — PROGRESS NOTES
Ayan W Bal  1945  77 y.o.    Reason for call: Recall  Prep prescribed: Suprep  Prep instructions reviewed with patient and sent to patient via regular mail to the home address on file  Clearance needed? No  If yes, what clearance is needed? N/A  Clearance has been requested from NA  The patient has been scheduled for: Colonoscopy  Family history of colon cancer? Yes  If yes, indicate relative: Brother & Sister   No family history on file.  No past medical history on file.  No Known Allergies  No past surgical history on file.  Social History     Socioeconomic History   • Marital status: Single       Current Outpatient Medications:   •  glimepiride (AMARYL) 4 MG tablet, glimepiride 4 mg oral tablet take 1 tablet (4 mg) by oral route once daily for 30 days 4/19/2021  Active, Disp: , Rfl:   •  ibuprofen (ADVIL,MOTRIN) 200 MG tablet, Advil 200 mg oral tablet take 2 tablets (400 mg) by oral route every 4 hours as needed with food   Active, Disp: , Rfl:   •  loperamide (IMODIUM A-D) 2 MG tablet, Imodium A-D 2 mg oral tablet take 2 tablets (4 mg) by oral route after 1st loose stool and 1 tablet (2 mg) after each next bowel movement; do not exceed 16 mg in 24hrs   Active, Disp: , Rfl:   •  metFORMIN (GLUCOPHAGE) 500 MG tablet, metformin 500 mg oral tablet take 1 tablet (500 mg) by oral route 2 times per day with morning and evening meals   Active, Disp: , Rfl:   •  Pancrelipase, Lip-Prot-Amyl, (Creon) 08373-031209 units capsule delayed-release particles capsule, Creon 36,000-114,000- 180,000 unit oral capsule,delayed release(DR/EC) take 1 capsule by oral route 3 times a day  with meals and snack. 3/5/2021  Active, Disp: , Rfl:

## 2023-06-24 PROBLEM — J18.9 PNEUMONIA: Status: ACTIVE | Noted: 2023-06-24

## 2023-06-26 PROBLEM — E43 SEVERE MALNUTRITION: Status: ACTIVE | Noted: 2023-06-26

## 2023-08-04 ENCOUNTER — TELEPHONE (OUTPATIENT)
Dept: UROLOGY | Facility: CLINIC | Age: 78
End: 2023-08-04
Payer: MEDICARE

## 2023-08-31 ENCOUNTER — TELEPHONE (OUTPATIENT)
Dept: GASTROENTEROLOGY | Facility: CLINIC | Age: 78
End: 2023-08-31
Payer: MEDICARE

## 2023-08-31 NOTE — TELEPHONE ENCOUNTER
Pt is requesting me to contact Nuha to let her know about procedure.   I have called and spoke to patient with an upcoming procedure reminder. Patient confirmed.

## 2023-09-12 NOTE — TELEPHONE ENCOUNTER
Ayan Selby  1945    Patient requested to Cancel their Colonoscopy. I have offered to reschedule this patient and patient has declined.   Reason for cancelling/rescheduling: patient has been sick and does not want to reschedule until feeling better.    This procedure was ordered by Mena Gallegos MD for an important reason. We want to inform you that there are risks associated with not proceeding with the procedure at this time such as a delay in diagnosis, risk of incurable disease, or cancer.    Patient plans to call us back to reschedule: YES      Patient verbalized understanding for all of the above information.